# Patient Record
Sex: MALE | Race: WHITE | NOT HISPANIC OR LATINO | Employment: FULL TIME | URBAN - METROPOLITAN AREA
[De-identification: names, ages, dates, MRNs, and addresses within clinical notes are randomized per-mention and may not be internally consistent; named-entity substitution may affect disease eponyms.]

---

## 2023-10-02 RX ORDER — DESVENLAFAXINE SUCCINATE 50 MG/1
50 TABLET, EXTENDED RELEASE ORAL DAILY
Qty: 90 TABLET | Refills: 0 | Status: SHIPPED | OUTPATIENT
Start: 2023-10-02 | End: 2023-12-31

## 2023-10-02 NOTE — TELEPHONE ENCOUNTER
Rx Refill Note  Requested Prescriptions     Pending Prescriptions Disp Refills    desvenlafaxine (PRISTIQ) 50 MG 24 hr tablet [Pharmacy Med Name: DESVENLAFAXINE SUCCNT ER 50 MG] 30 tablet 2     Sig: TAKE 1 TABLET BY MOUTH DAILY      Last office visit with prescribing clinician: Visit date not found   Last telemedicine visit with prescribing clinician: Visit date not found   Next office visit with prescribing clinician: Visit date not found     Deedee Lagunas MA  10/02/23, 09:27 EDT

## 2023-12-26 RX ORDER — DESVENLAFAXINE SUCCINATE 50 MG/1
50 TABLET, EXTENDED RELEASE ORAL DAILY
Qty: 90 TABLET | Refills: 0 | Status: SHIPPED | OUTPATIENT
Start: 2023-12-26 | End: 2024-03-25

## 2023-12-26 NOTE — TELEPHONE ENCOUNTER
Rx Refill Note  Requested Prescriptions     Pending Prescriptions Disp Refills    desvenlafaxine (PRISTIQ) 50 MG 24 hr tablet 90 tablet 0     Sig: Take 1 tablet by mouth Daily for 90 days. Indications: Major Depressive Disorder      Last office visit with prescribing clinician: Visit date not found   Last telemedicine visit with prescribing clinician: Visit date not found   Next office visit with prescribing clinician: 1/4/2024       Pillo Whitman  12/26/23, 14:46 EST

## 2023-12-26 NOTE — TELEPHONE ENCOUNTER
Caller: Demarcus Wills    Relationship: Self    Best call back number: [phone]     Requested Prescriptions:   Requested Prescriptions     Pending Prescriptions Disp Refills    desvenlafaxine (PRISTIQ) 50 MG 24 hr tablet 90 tablet 0     Sig: Take 1 tablet by mouth Daily for 90 days. Indications: Major Depressive Disorder        Pharmacy where request should be sent: [facility] [id number] - [address] - [phone]  - [phone] FX     Last office visit with prescribing clinician: Visit date not found   Last telemedicine visit with prescribing clinician: Visit date not found   Next office visit with prescribing clinician: 1/4/2024     Additional details provided by patient: PATIENT REQUESTS REFILL TO LAST UNTIL HIS UPCOMING APPOINTMENT ON 1/4/24    Does the patient have less than a 3 day supply:  [x] Yes  [] No    Would you like a call back once the refill request has been completed: [] Yes [x] No    If the office needs to give you a call back, can they leave a voicemail: [] Yes [x] No    Tanvi Wallace Rep   12/26/23 14:42 EST

## 2024-03-25 RX ORDER — DESVENLAFAXINE SUCCINATE 50 MG/1
50 TABLET, EXTENDED RELEASE ORAL DAILY
Qty: 90 TABLET | Refills: 0 | OUTPATIENT
Start: 2024-03-25

## 2024-03-25 NOTE — TELEPHONE ENCOUNTER
Name: Demarcus Wills    Relationship: Self    Best Callback Number: [phone]    HUB PROVIDED THE RELAY MESSAGE FROM THE OFFICE   PATIENT SCHEDULED AS REQUESTED    ADDITIONAL INFORMATION:PATIENT WANTED TO KNOW SINCE APPOINTMENT IS SCHEDULED CAN HE GET A REFILL UNTIL APPOINTMENT AS HE WILL BE [place] OF TOWN.

## 2024-03-25 NOTE — TELEPHONE ENCOUNTER
Rx Refill Note  Requested Prescriptions     Pending Prescriptions Disp Refills    desvenlafaxine (PRISTIQ) 50 MG 24 hr tablet [Pharmacy Med Name: DESVENLAFAXINE SUCCNT ER 50 MG] 90 tablet 0     Sig: TAKE 1 TABLET BY MOUTH DAILY FOR 90 DAYS      Last office visit with prescribing clinician: Visit date not found   Next office visit with prescribing clinician: Visit date not found     Luis Antonio De La Fuente CMA  03/25/24, 09:32 EDT

## 2024-03-26 RX ORDER — DESVENLAFAXINE SUCCINATE 50 MG/1
50 TABLET, EXTENDED RELEASE ORAL DAILY
Qty: 90 TABLET | Refills: 0 | OUTPATIENT
Start: 2024-03-26

## 2024-03-28 RX ORDER — DESVENLAFAXINE SUCCINATE 50 MG/1
50 TABLET, EXTENDED RELEASE ORAL DAILY
Qty: 30 TABLET | Refills: 0 | Status: SHIPPED | OUTPATIENT
Start: 2024-03-28 | End: 2024-06-26

## 2024-03-28 NOTE — TELEPHONE ENCOUNTER
Rx Refill Note  Requested Prescriptions     Pending Prescriptions Disp Refills    desvenlafaxine (PRISTIQ) 50 MG 24 hr tablet 90 tablet 0     Sig: Take 1 tablet by mouth Daily for 90 days. Indications: Major Depressive Disorder      Last office visit with prescribing clinician: Visit date not found   Next office visit with prescribing clinician: 4/18/2024     Deedee Lagunas MA  03/28/24, 09:39 EDT

## 2024-04-29 ENCOUNTER — OFFICE VISIT (OUTPATIENT)
Dept: FAMILY MEDICINE CLINIC | Facility: CLINIC | Age: 34
End: 2024-04-29
Payer: COMMERCIAL

## 2024-04-29 VITALS
HEIGHT: 75 IN | OXYGEN SATURATION: 97 % | BODY MASS INDEX: 39.17 KG/M2 | DIASTOLIC BLOOD PRESSURE: 87 MMHG | SYSTOLIC BLOOD PRESSURE: 138 MMHG | HEART RATE: 73 BPM | WEIGHT: 315 LBS

## 2024-04-29 DIAGNOSIS — E66.01 SEVERE OBESITY (BMI >= 40): ICD-10-CM

## 2024-04-29 DIAGNOSIS — F41.1 GAD (GENERALIZED ANXIETY DISORDER): Primary | ICD-10-CM

## 2024-04-29 PROCEDURE — 99203 OFFICE O/P NEW LOW 30 MIN: CPT | Performed by: FAMILY MEDICINE

## 2024-04-29 RX ORDER — DESVENLAFAXINE SUCCINATE 50 MG/1
50 TABLET, EXTENDED RELEASE ORAL DAILY
Qty: 90 TABLET | Refills: 1 | Status: SHIPPED | OUTPATIENT
Start: 2024-04-29

## 2024-04-29 NOTE — PATIENT INSTRUCTIONS
Continue your current medications, healthy diet and regular exercise.   Plan physical and fasting labs in next 6 months.

## 2024-04-29 NOTE — PROGRESS NOTES
"Chief Complaint  Anxiety    Subjective    History of Present Illness {CC  Problem List  Visit  Diagnosis   Encounters  Notes  Medications  Labs  Result Review Imaging  Media :23}     Demarcus Wills presents to [facility] for Anxiety.    He presents for follow up of anxiety. He is currently on pristiq 50mg daily and doing well. He reports that his anxiety is well controlled and he denies any side effects. He was considering stopping but recently received a promotion at work and is expecting his 4th child in a couple of months.     Objective     Vital Signs:   /87   Pulse 73   Ht 190.5 cm (75\")   Wt (!) 146 kg (322 lb 8 oz)   SpO2 97%   BMI 40.31 kg/m²   Body mass index is 40.31 kg/m².     Physical Exam  Constitutional:       General: He is not in acute distress.  Cardiovascular:      Rate and Rhythm: Normal rate and regular rhythm.      Heart sounds: No murmur heard.  Pulmonary:      Effort: No respiratory distress.      Breath sounds: Normal breath sounds.   Neurological:      General: No focal deficit present.      Mental Status: He is alert.   Psychiatric:         Behavior: Behavior normal.          Result Review  Data Reviewed:{ Labs  Result Review  Imaging  Med Tab  Media :23}                Assessment and Plan {CC Problem List  Visit Diagnosis  ROS  Review (Popup)  Health Maintenance  Quality  BestPractice  Medications  SmartSets  SnapShot Encounters  Media :23}   Diagnoses and all orders for this visit:    1. TIARRA (generalized anxiety disorder) (Primary)    2. Severe obesity (BMI >= 40)        Patient Instructions   Continue your current medications, healthy diet and regular exercise.   Plan physical and fasting labs in next 6 months.      Patient was given instructions and counseling regarding his condition or for health maintenance advice on the AVS.       Return in about 6 months (around 10/29/2024) for Annual.    Jessika Krishnamurthy MD "

## 2024-10-29 ENCOUNTER — OFFICE VISIT (OUTPATIENT)
Dept: FAMILY MEDICINE CLINIC | Facility: CLINIC | Age: 34
End: 2024-10-29
Payer: COMMERCIAL

## 2024-10-29 VITALS
DIASTOLIC BLOOD PRESSURE: 84 MMHG | WEIGHT: 313.5 LBS | SYSTOLIC BLOOD PRESSURE: 132 MMHG | OXYGEN SATURATION: 96 % | BODY MASS INDEX: 38.98 KG/M2 | HEART RATE: 77 BPM | HEIGHT: 75 IN

## 2024-10-29 DIAGNOSIS — Z00.00 WELL ADULT EXAM: Primary | ICD-10-CM

## 2024-10-29 DIAGNOSIS — Z11.59 NEED FOR HEPATITIS C SCREENING TEST: ICD-10-CM

## 2024-10-29 DIAGNOSIS — F41.1 GAD (GENERALIZED ANXIETY DISORDER): ICD-10-CM

## 2024-10-29 DIAGNOSIS — R53.83 FATIGUE, UNSPECIFIED TYPE: ICD-10-CM

## 2024-10-29 DIAGNOSIS — R73.9 HYPERGLYCEMIA: ICD-10-CM

## 2024-10-29 PROCEDURE — 90715 TDAP VACCINE 7 YRS/> IM: CPT | Performed by: FAMILY MEDICINE

## 2024-10-29 PROCEDURE — 99213 OFFICE O/P EST LOW 20 MIN: CPT | Performed by: FAMILY MEDICINE

## 2024-10-29 PROCEDURE — 90471 IMMUNIZATION ADMIN: CPT | Performed by: FAMILY MEDICINE

## 2024-10-29 PROCEDURE — 99395 PREV VISIT EST AGE 18-39: CPT | Performed by: FAMILY MEDICINE

## 2024-10-29 RX ORDER — DESVENLAFAXINE 25 MG/1
25 TABLET, EXTENDED RELEASE ORAL DAILY
Qty: 90 TABLET | Refills: 1 | Status: SHIPPED | OUTPATIENT
Start: 2024-10-29

## 2024-10-29 NOTE — PATIENT INSTRUCTIONS
Aim for 150 minutes of aerobic exercise weekly.  You had lab tests today. You should receive a call or my chart message with your test results. If you have not received your results in the next 7-10 days, please contact the office.    You can try weaning pristiq, by reducing to 25 daily for 1 month and then every other day for a month. If you have problems despite this, let us know.

## 2024-10-29 NOTE — PROGRESS NOTES
Chief Complaint  Anxiety and Annual Exam    Subjective    History of Present Illness {  Problem List  Visit  Diagnosis   Encounters  Notes  Medications  Labs  Result Review Imaging  Media :23}     Demarcus Wills presents to [facility] for Anxiety and Annual Exam.  He is  and has 4 kids--11, 9, 4 and 3 months. He works as a . He has never been a smoker. He has no family history of colon cancer. He exercises on average 2-3 times a week. He is up to date on routine dental and eye exams.     Their chronic medical conditions were reviewed and are stable unless noted otherwise below.  He has a history of anxiety and is currently on desvenlafaxine His anxiety has been well controlled on medication and he would like to try weaning the medication. He tolerates well but has significant symptoms if he misses a dose.    He also has a history of mild hyperglycemia and has family history of diabetes in his father and grandfather. He is fasting for labs today.       Social History     Socioeconomic History    Marital status:    Tobacco Use    Smoking status: Never    Smokeless tobacco: Never   Vaping Use    Vaping status: Never Used        Review of Systems   Constitutional:  Positive for fatigue (mild, attributes to decreased sleep with 4 month old).   HENT:  Negative for ear pain and sinus pain.    Eyes:  Negative for pain and visual disturbance.   Respiratory:  Negative for cough and shortness of breath.    Cardiovascular:  Negative for chest pain and palpitations.   Gastrointestinal:  Negative for abdominal pain, constipation and diarrhea.   Genitourinary:  Negative for dysuria.   Musculoskeletal:  Negative for arthralgias.   Skin:  Negative for color change.   Allergic/Immunologic: Positive for environmental allergies (uses flonase and allegra otc as needed).   Neurological:  Negative for dizziness and headaches.   Psychiatric/Behavioral:  Negative for  "dysphoric mood and suicidal ideas. The patient is not nervous/anxious.         Objective       Vital Signs:   /84   Pulse 77   Ht 190.5 cm (75\")   Wt (!) 142 kg (313 lb 8 oz)   SpO2 96%   BMI 39.18 kg/m²     Body mass index is 39.18 kg/m².     PHQ-9 Depression Screening  Little interest or pleasure in doing things?     Feeling down, depressed, or hopeless?     PHQ-2 Total Score     Trouble falling or staying asleep, or sleeping too much?     Feeling tired or having little energy?     Poor appetite or overeating?     Feeling bad about yourself - or that you are a failure or have let yourself or your family down?     Trouble concentrating on things, such as reading the newspaper or watching television?     Moving or speaking so slowly that other people could have noticed? Or the opposite - being so fidgety or restless that you have been moving around a lot more than usual?     Thoughts that you would be better off dead, or of hurting yourself in some way?     PHQ-9 Total Score     If you checked off any problems, how difficult have these problems made it for you to do your work, take care of things at home, or get along with other people?        Physical Exam  Constitutional:       General: He is not in acute distress.  HENT:      Head: Normocephalic and atraumatic.      Nose: No rhinorrhea.      Mouth/Throat:      Mouth: Mucous membranes are moist.   Eyes:      Conjunctiva/sclera: Conjunctivae normal.   Cardiovascular:      Rate and Rhythm: Normal rate and regular rhythm.      Pulses: Normal pulses.      Heart sounds: No murmur heard.  Pulmonary:      Effort: No respiratory distress.      Breath sounds: Normal breath sounds.   Abdominal:      General: There is no distension.      Palpations: Abdomen is soft.      Tenderness: There is no abdominal tenderness.   Musculoskeletal:      Right lower leg: No edema.      Left lower leg: No edema.   Lymphadenopathy:      Cervical: No cervical adenopathy.   Skin:    "  General: Skin is warm.      Findings: No lesion.   Neurological:      General: No focal deficit present.      Mental Status: He is alert.   Psychiatric:         Behavior: Behavior normal.          Result Review  Data Reviewed:{ Labs  Result Review  Imaging  Med Tab  Media :23}                Assessment and Plan {CC Problem List  Visit Diagnosis  ROS  Review (Popup)  Health Maintenance  Quality  BestPractice  Medications  SmartSets  SnapShot Encounters  Media :23}   Diagnoses and all orders for this visit:    1. Well adult exam (Primary)  -     CBC & Differential  -     Comprehensive Metabolic Panel  -     Lipid Panel  -     TSH Rfx On Abnormal To Free T4    2. TIARRA (generalized anxiety disorder)  -     TSH Rfx On Abnormal To Free T4    3. Fatigue, unspecified type  -     CBC & Differential  -     TSH Rfx On Abnormal To Free T4    4. Need for hepatitis C screening test  -     Hepatitis C Antibody    5. Hyperglycemia  -     Comprehensive Metabolic Panel  -     Hemoglobin A1c    Other orders  -     Desvenlafaxine Succinate ER (Pristiq) 25 MG tablet sustained-release 24 hour; Take 1 tablet by mouth Daily. And wean monthly as tolerated  Dispense: 90 tablet; Refill: 1  -     Tdap Vaccine Greater Than or Equal To 8yo IM        Patient Instructions   Aim for 150 minutes of aerobic exercise weekly.  You had lab tests today. You should receive a call or my chart message with your test results. If you have not received your results in the next 7-10 days, please contact the office.    You can try weaning pristiq, by reducing to 25 daily for 1 month and then every other day for a month. If you have problems despite this, let us know.    If difficulty weaning could change to prozac and then wean the prozac.     Patient was given instructions and counseling regarding his condition or for health maintenance advice on the AVS.       Return in about 1 year (around 10/29/2025) for Annual physical.    Jessika Dover  MD Raghu

## 2024-10-30 LAB
ALBUMIN SERPL-MCNC: 4.5 G/DL (ref 4.1–5.1)
ALP SERPL-CCNC: 72 IU/L (ref 44–121)
ALT SERPL-CCNC: 34 IU/L (ref 0–44)
AST SERPL-CCNC: 22 IU/L (ref 0–40)
BASOPHILS # BLD AUTO: 0.1 X10E3/UL (ref 0–0.2)
BASOPHILS NFR BLD AUTO: 1 %
BILIRUB SERPL-MCNC: 0.7 MG/DL (ref 0–1.2)
BUN SERPL-MCNC: 13 MG/DL (ref 6–20)
BUN/CREAT SERPL: 14 (ref 9–20)
CALCIUM SERPL-MCNC: 9.8 MG/DL (ref 8.7–10.2)
CHLORIDE SERPL-SCNC: 102 MMOL/L (ref 96–106)
CHOLEST SERPL-MCNC: 185 MG/DL (ref 100–199)
CO2 SERPL-SCNC: 22 MMOL/L (ref 20–29)
CREAT SERPL-MCNC: 0.9 MG/DL (ref 0.76–1.27)
EGFRCR SERPLBLD CKD-EPI 2021: 115 ML/MIN/1.73
EOSINOPHIL # BLD AUTO: 0.3 X10E3/UL (ref 0–0.4)
EOSINOPHIL NFR BLD AUTO: 3 %
ERYTHROCYTE [DISTWIDTH] IN BLOOD BY AUTOMATED COUNT: 13 % (ref 11.6–15.4)
GLOBULIN SER CALC-MCNC: 2.6 G/DL (ref 1.5–4.5)
GLUCOSE SERPL-MCNC: 94 MG/DL (ref 70–99)
HBA1C MFR BLD: 5.7 % (ref 4.8–5.6)
HCT VFR BLD AUTO: 49.3 % (ref 37.5–51)
HCV IGG SERPL QL IA: NON REACTIVE
HDLC SERPL-MCNC: 34 MG/DL
HGB BLD-MCNC: 16.2 G/DL (ref 13–17.7)
IMM GRANULOCYTES # BLD AUTO: 0 X10E3/UL (ref 0–0.1)
IMM GRANULOCYTES NFR BLD AUTO: 0 %
LDLC SERPL CALC-MCNC: 121 MG/DL (ref 0–99)
LYMPHOCYTES # BLD AUTO: 2.4 X10E3/UL (ref 0.7–3.1)
LYMPHOCYTES NFR BLD AUTO: 33 %
MCH RBC QN AUTO: 28 PG (ref 26.6–33)
MCHC RBC AUTO-ENTMCNC: 32.9 G/DL (ref 31.5–35.7)
MCV RBC AUTO: 85 FL (ref 79–97)
MONOCYTES # BLD AUTO: 0.5 X10E3/UL (ref 0.1–0.9)
MONOCYTES NFR BLD AUTO: 7 %
NEUTROPHILS # BLD AUTO: 4.1 X10E3/UL (ref 1.4–7)
NEUTROPHILS NFR BLD AUTO: 56 %
PLATELET # BLD AUTO: 297 X10E3/UL (ref 150–450)
POTASSIUM SERPL-SCNC: 4.4 MMOL/L (ref 3.5–5.2)
PROT SERPL-MCNC: 7.1 G/DL (ref 6–8.5)
RBC # BLD AUTO: 5.79 X10E6/UL (ref 4.14–5.8)
SODIUM SERPL-SCNC: 140 MMOL/L (ref 134–144)
TRIGL SERPL-MCNC: 166 MG/DL (ref 0–149)
TSH SERPL DL<=0.005 MIU/L-ACNC: 1 UIU/ML (ref 0.45–4.5)
VLDLC SERPL CALC-MCNC: 30 MG/DL (ref 5–40)
WBC # BLD AUTO: 7.5 X10E3/UL (ref 3.4–10.8)

## 2024-11-06 RX ORDER — DESVENLAFAXINE 50 MG/1
50 TABLET, FILM COATED, EXTENDED RELEASE ORAL DAILY
Qty: 30 TABLET | OUTPATIENT
Start: 2024-11-06